# Patient Record
Sex: FEMALE | HISPANIC OR LATINO | URBAN - METROPOLITAN AREA
[De-identification: names, ages, dates, MRNs, and addresses within clinical notes are randomized per-mention and may not be internally consistent; named-entity substitution may affect disease eponyms.]

---

## 2017-04-20 ENCOUNTER — GENERIC CONVERSION - ENCOUNTER (OUTPATIENT)
Dept: OTHER | Facility: OTHER | Age: 22
End: 2017-04-20

## 2017-04-21 ENCOUNTER — GENERIC CONVERSION - ENCOUNTER (OUTPATIENT)
Dept: OTHER | Facility: OTHER | Age: 22
End: 2017-04-21

## 2017-11-07 ENCOUNTER — GENERIC CONVERSION - ENCOUNTER (OUTPATIENT)
Dept: OTHER | Facility: OTHER | Age: 22
End: 2017-11-07

## 2017-11-07 DIAGNOSIS — Z28.39 UNDERIMMUNIZATION STATUS: ICD-10-CM

## 2018-01-15 NOTE — MISCELLANEOUS
Reason For Visit  Reason For Visit Free Text Note Form: Assistance wit MA application appeal     Case Management Documentation [facility]:   Information obtained from the patient, family member(s) and medical record Father  Patient's financial status importance of compliance with treatment and College Student  Action Plan: supportive counseling/advocacy and information provided  plan reviewed  Progress Note  SW met with pt and her father this date and assisted with call to the [address] Customer Service # [phone]  Pt is attempting to appeal her MA denial but could not get through on the phone, Pt to complete paper appeal and mail it in  MS Pepe of [employer] confirms they have received her previous employers info but not her school schedule to confirm she is a full time student  At pt's request this info faxed this date and confirmation received  Ms Zaynab Middletonbailey relate the did not know that pt was working part time and was a full time student  This was updated  She gave pt the confirmation # [id number]  Pt /father to f/u with [employer] next week re same  SW to remain available to assist as indicated  Active Problems    1  Abdominal muscle strain (848 8) (S39 011A)   2  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)   3  Bloating (787 3) (R14 0)   4  Cervical cancer screening (V76 2) (Z12 4)   5  Flatulence/gas pain/belching (787 3) (R14 0)   6  Neck pain (723 1) (M54 2)   7  Need for prophylactic vaccination and inoculation against influenza (V04 81) (Z23)   8  Trapezius muscle spasm (728 85) (D55 007)    Current Meds   1  Cyclobenzaprine HCl - 10 MG Oral Tablet; TAKE ONE HALF TABLET TWICE A DAY; Therapy: 11Aug2015 to (Evaluate:21Aug2015)  Requested for: 11Aug2015; Last   Rx:11Aug2015 Ordered   2  Ibuprofen 600 MG Oral Tablet; TAKE 1 TABLET EVERY 8 HOURS AS NEEDED FOR PAIN;   Therapy: 92GWP0267 to (Evaluate:[id number])  Requested for: 11Aug2015; Last   Rx:11Aug2015 Ordered   3   Vaniqa 13 9 % External Cream; APPLY AND GENTLY MASSAGE INTO AFFECTED   AREA(S) ONCE DAILY; Therapy: 04Apr2014 to (Last Rx:04Apr2014) Ordered    Allergies    1  No Known Drug Allergies    2  No Known Environmental Allergies   3   No Known Food Allergies    Future Appointments    Date/Time Provider Specialty Site   05/04/2017 09:40 AM Miguel Graves MD [facility]astro[facility]ology Adult [facility] [address]     Signatures   Electronically signed by : Ofelia White LCSW; Apr 20 2017  2:55PM EST                       (Author)

## 2018-01-22 VITALS
HEIGHT: 64 IN | HEART RATE: 66 BPM | WEIGHT: 121.69 LBS | TEMPERATURE: 98 F | SYSTOLIC BLOOD PRESSURE: 100 MMHG | BODY MASS INDEX: 20.78 KG/M2 | DIASTOLIC BLOOD PRESSURE: 60 MMHG